# Patient Record
Sex: MALE | NOT HISPANIC OR LATINO | URBAN - METROPOLITAN AREA
[De-identification: names, ages, dates, MRNs, and addresses within clinical notes are randomized per-mention and may not be internally consistent; named-entity substitution may affect disease eponyms.]

---

## 2017-05-07 ENCOUNTER — EMERGENCY (EMERGENCY)
Facility: HOSPITAL | Age: 22
LOS: 1 days | Discharge: HOME | End: 2017-05-07
Attending: EMERGENCY MEDICINE | Admitting: EMERGENCY MEDICINE
Payer: COMMERCIAL

## 2017-05-07 VITALS
TEMPERATURE: 98 F | RESPIRATION RATE: 18 BRPM | DIASTOLIC BLOOD PRESSURE: 56 MMHG | OXYGEN SATURATION: 98 % | HEART RATE: 97 BPM | SYSTOLIC BLOOD PRESSURE: 108 MMHG

## 2017-05-07 VITALS
TEMPERATURE: 98 F | HEART RATE: 96 BPM | RESPIRATION RATE: 18 BRPM | HEIGHT: 71 IN | WEIGHT: 154.98 LBS | OXYGEN SATURATION: 97 % | DIASTOLIC BLOOD PRESSURE: 82 MMHG | SYSTOLIC BLOOD PRESSURE: 121 MMHG

## 2017-05-07 DIAGNOSIS — F43.23 ADJUSTMENT DISORDER WITH MIXED ANXIETY AND DEPRESSED MOOD: ICD-10-CM

## 2017-05-07 DIAGNOSIS — R45.851 SUICIDAL IDEATIONS: ICD-10-CM

## 2017-05-07 DIAGNOSIS — F10.10 ALCOHOL ABUSE, UNCOMPLICATED: ICD-10-CM

## 2017-05-07 LAB
ALBUMIN SERPL ELPH-MCNC: 4.5 G/DL — SIGNIFICANT CHANGE UP (ref 3.3–5)
ALP SERPL-CCNC: 56 U/L — SIGNIFICANT CHANGE UP (ref 40–120)
ALT FLD-CCNC: 14 U/L RC — SIGNIFICANT CHANGE UP (ref 10–45)
AMPHET UR-MCNC: NEGATIVE — SIGNIFICANT CHANGE UP
ANION GAP SERPL CALC-SCNC: 15 MMOL/L — SIGNIFICANT CHANGE UP (ref 5–17)
APAP SERPL-MCNC: <15 UG/ML — SIGNIFICANT CHANGE UP (ref 10–30)
AST SERPL-CCNC: 22 U/L — SIGNIFICANT CHANGE UP (ref 10–40)
BARBITURATES UR SCN-MCNC: NEGATIVE — SIGNIFICANT CHANGE UP
BENZODIAZ UR-MCNC: NEGATIVE — SIGNIFICANT CHANGE UP
BILIRUB SERPL-MCNC: 0.3 MG/DL — SIGNIFICANT CHANGE UP (ref 0.2–1.2)
BUN SERPL-MCNC: 15 MG/DL — SIGNIFICANT CHANGE UP (ref 7–23)
CALCIUM SERPL-MCNC: 8.5 MG/DL — SIGNIFICANT CHANGE UP (ref 8.4–10.5)
CHLORIDE SERPL-SCNC: 104 MMOL/L — SIGNIFICANT CHANGE UP (ref 96–108)
CO2 SERPL-SCNC: 23 MMOL/L — SIGNIFICANT CHANGE UP (ref 22–31)
COCAINE METAB.OTHER UR-MCNC: NEGATIVE — SIGNIFICANT CHANGE UP
CREAT SERPL-MCNC: 0.91 MG/DL — SIGNIFICANT CHANGE UP (ref 0.5–1.3)
ETHANOL SERPL-MCNC: 208 MG/DL — HIGH (ref 0–10)
GLUCOSE SERPL-MCNC: 91 MG/DL — SIGNIFICANT CHANGE UP (ref 70–99)
HCT VFR BLD CALC: 39.8 % — SIGNIFICANT CHANGE UP (ref 39–50)
HGB BLD-MCNC: 14.4 G/DL — SIGNIFICANT CHANGE UP (ref 13–17)
MCHC RBC-ENTMCNC: 30.5 PG — SIGNIFICANT CHANGE UP (ref 27–34)
MCHC RBC-ENTMCNC: 36.2 GM/DL — HIGH (ref 32–36)
MCV RBC AUTO: 84.2 FL — SIGNIFICANT CHANGE UP (ref 80–100)
METHADONE UR-MCNC: NEGATIVE — SIGNIFICANT CHANGE UP
OPIATES UR-MCNC: NEGATIVE — SIGNIFICANT CHANGE UP
OXYCODONE UR-MCNC: NEGATIVE — SIGNIFICANT CHANGE UP
PCP SPEC-MCNC: SIGNIFICANT CHANGE UP
PCP UR-MCNC: NEGATIVE — SIGNIFICANT CHANGE UP
PLATELET # BLD AUTO: 214 K/UL — SIGNIFICANT CHANGE UP (ref 150–400)
POTASSIUM SERPL-MCNC: 3.9 MMOL/L — SIGNIFICANT CHANGE UP (ref 3.5–5.3)
POTASSIUM SERPL-SCNC: 3.9 MMOL/L — SIGNIFICANT CHANGE UP (ref 3.5–5.3)
PROT SERPL-MCNC: 6.5 G/DL — SIGNIFICANT CHANGE UP (ref 6–8.3)
RBC # BLD: 4.73 M/UL — SIGNIFICANT CHANGE UP (ref 4.2–5.8)
RBC # FLD: 11.3 % — SIGNIFICANT CHANGE UP (ref 10.3–14.5)
SALICYLATES SERPL-MCNC: <2 MG/DL — LOW (ref 15–30)
SODIUM SERPL-SCNC: 142 MMOL/L — SIGNIFICANT CHANGE UP (ref 135–145)
THC UR QL: NEGATIVE — SIGNIFICANT CHANGE UP
WBC # BLD: 7.6 K/UL — SIGNIFICANT CHANGE UP (ref 3.8–10.5)
WBC # FLD AUTO: 7.6 K/UL — SIGNIFICANT CHANGE UP (ref 3.8–10.5)

## 2017-05-07 PROCEDURE — 99053 MED SERV 10PM-8AM 24 HR FAC: CPT

## 2017-05-07 PROCEDURE — 90792 PSYCH DIAG EVAL W/MED SRVCS: CPT | Mod: GC

## 2017-05-07 PROCEDURE — 99284 EMERGENCY DEPT VISIT MOD MDM: CPT | Mod: 25

## 2017-05-07 PROCEDURE — 93010 ELECTROCARDIOGRAM REPORT: CPT

## 2017-05-07 NOTE — ED PROVIDER NOTE - OTHER FINDINGS
axis 86 NO MOHIT **ATTENDING ADDENDUM (Dr. James Arnold): left ventricular hypertrophy with likely early repolarization-associated ST changes

## 2017-05-07 NOTE — ED PROVIDER NOTE - EYES, MLM
Clear bilaterally, pupils equal, round and reactive to light. **ATTENDING ADDENDUM (Dr. James Arnold): Extraocular muscle movements intact. NO nystagmus. NO visual hallucinations reported.

## 2017-05-07 NOTE — ED PROVIDER NOTE - ENMT, MLM
Airway patent, Nasal mucosa clear. Mouth with normal mucosa. Throat has no vesicles, no oropharyngeal exudates and uvula is midline. **ATTENDING ADDENDUM (Dr. James Arnold): NO auditory hallucinations reported.

## 2017-05-07 NOTE — ED PROVIDER NOTE - GASTROINTESTINAL, MLM
Abdomen soft, non-tender **ATTENDING ADDENDUM (Dr. James Arnold): NO guarding, rebound, or rigidity. NO pulsatile or non-pulsatile masses. NO hernias. NO obvious hepatosplenomegaly.
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
## 2017-05-07 NOTE — ED BEHAVIORAL HEALTH ASSESSMENT NOTE - DESCRIPTION (FIRST USE, LAST USE, QUANTITY, FREQUENCY, DURATION)
Last had 10 drinks yesterday evening. Drinks that amount on weekends or special occasions socially with college friends.
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
## 2017-05-07 NOTE — ED BEHAVIORAL HEALTH ASSESSMENT NOTE - DESCRIPTION
Patient was calm and cooperative in the ED, requiring no PRNs or emergent intramuscular medications. CBC, CMP within normal limits. Utox negative. Blood alcohol level 208 at 5 am. 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 Family in [place], where he grew up. See HPI for more information.

## 2017-05-07 NOTE — ED PROVIDER NOTE - RESPIRATORY, MLM
Breath sounds clear and equal bilaterally. **ATTENDING ADDENDUM (Dr. James Arnold): NO wheezing, rales, rhonchi, crackles, stridor, drooling, retractions, nasal flaring, or tripoding.

## 2017-05-07 NOTE — ED PROVIDER NOTE - MEDICAL DECISION MAKING DETAILS
**ATTENDING MEDICAL DECISION MAKING/SYNTHESIS (Dr. James Arnold): I have reviewed the Chief Complaint, the HPI, the ROS, and have directly performed and confirmed the findings on the Physical Examination. I have reviewed the medical decision making with all providers, as applicable. The PROBLEM REPRESENTATION at this time is: 21-year-old man with NO reported prior history of psychiatric admissions/chronic care (but did admit previous history of suicidal ideation) now presenting with suicidal ideation (NO homicidal ideation, thought broadcasting, flight of ideas, thought insertion, auditory hallucinations, or visual hallucinations; NO recent stressors except school [[employer]]), POSITIVE self-injurious behavior (hit forehead with own fist). The MOST LIKELY DIAGNOSIS, and the LIST OF DIFFERENTIAL DIAGNOSES, includes (but is not limited to) the following: suicidal ideation, adjustment disorder, intoxication/delirium, homicidal ideation (NO evidence of this diagnosis), major depressive disorder. The likelihood of each of these diagnoses has been appropriately considered in the context of this patient's presentation and my evaluation. PLAN: as described in EMR, including diagnostics, therapeutics and consultation as clinically warranted. I will continue to reevaluate the patient, including the results of all testing, and monitor response to therapy throughout the patient's course in the ED.

## 2017-05-07 NOTE — ED PROVIDER NOTE - CHPI ED SYMPTOMS NEG
**ATTENDING ADDENDUM (Dr. James Arnold): admits some alcohol use/no agitation/no confusion/no disorientation/no hallucinations/no homicidal/no weakness/no weight loss/no change in level of consciousness/no paranoia

## 2017-05-07 NOTE — ED PROVIDER NOTE - SHIFT CHANGE DETAILS
**ATTENDING ADDENDUM (Dr. James Arnold): (1) reassess patient (2) followup on psychiatric consultation and recommendations

## 2017-05-07 NOTE — ED BEHAVIORAL HEALTH ASSESSMENT NOTE - SUMMARY
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 On interview, patient does not meet criteria for a depressive, manic, or psychotic episode. His symptoms can be linked to psychosocial stressors and alcohol use. He meets criteria for alcohol use disorder and adjustment disorder. Patient does not require inpatient admission as he is not at imminent risk to self or others. He is appropriate for discharge with close outpatient follow up.

## 2017-05-07 NOTE — ED PROVIDER NOTE - PSYCHIATRIC RISK
**ATTENDING ADDENDUM (Dr. James Arnold): NO specific plan/attempt, NO homicidal ideation/VERBALIZING SUICIDAL IDEATIONS

## 2017-05-07 NOTE — ED PROVIDER NOTE - PSYCHIATRIC PERCEPTION
**ATTENDING ADDENDUM (Dr. James Arnold): NO flight of ideas, thought broadcasting, or thought insertion./normal

## 2017-05-07 NOTE — ED PROVIDER NOTE - OBJECTIVE STATEMENT
**ATTENDING OBJECTIVE STATEMENT (Dr. James Arnold): I have reviewed this note, the presenting symptoms, and the Chief Complaint and the History of Present Illness as documented, with the other care provider(s) and nurses on the patient care team. I have also reviewed this patient's past medical/surgical history and social/family history as reviewed and listed in this electronic medical record. Patient is a 21-year-old man with NO reported prior history of psychiatric admissions/chronic care (but did admit previous history of suicidal ideation) now presenting with suicidal ideation (NO homicidal ideation, thought broadcasting, flight of ideas, thought insertion, auditory hallucinations, or visual hallucinations; NO recent stressors except school [[employer]]), POSITIVE self-injurious behavior (hit forehead with own fist).
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
## 2017-05-07 NOTE — ED PROVIDER NOTE - CRANIAL NERVE AND PUPILLARY EXAM
cranial nerves 2-12 intact/central and peripheral vision intact/extra-ocular movements intact/tongue is midline

## 2017-05-07 NOTE — ED BEHAVIORAL HEALTH ASSESSMENT NOTE - SUICIDE PROTECTIVE FACTORS
Responsibility to family and others/Identifies reasons for living/Future oriented/Supportive social network or family/Fear of death or dying due to pain/suffering/Engaged in work or school

## 2017-05-07 NOTE — ED PROVIDER NOTE - PROGRESS NOTE DETAILS
**ATTENDING ADDENDUM (Dr. James Arnold): patient evaluated by me. Was conversant. Psychiatry called. Requested additional diagnostics, to evaluate patient when diagnostics return to confirm patient's level of intoxication (admitted alcohol ingestion prior to arrival last PM/this AM). Will continue to observe and monitor closely. Attending Note (Ronald): Signed out pending psych eval. Psych note appreciated. calm and cooperative in ED.  DC.

## 2017-05-07 NOTE — ED PROVIDER NOTE - PLAN OF CARE
ATTENDING ADDENDUM (Dr. James Arnold): Goals of care include resolution of emergent/urgent symptoms and concerns, and restoration to baseline level of homeostasis. ATTENDING ADDENDUM (Dr. James Arnold): (1) anticipatory guidance provided  (2) rest  (3) outpatient follow-up with your primary care physician/provider (4) return if symptoms worsen, persist, or do not resolve (5) medications, if indicated, as prescribed
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
## 2017-05-07 NOTE — ED PROVIDER NOTE - CONDUCTED A DETAILED DISCUSSION WITH PATIENT AND/OR GUARDIAN REGARDING, MDM
**ATTENDING ADDENDUM (Dr. James Arnold): Anticipatory guidance provided./need for outpatient follow-up/return to ED if symptoms worsen, persist or questions arise

## 2017-05-07 NOTE — ED PROVIDER NOTE - PHYSICAL EXAMINATION
**ATTENDING ADDENDUM (Dr. James Arnold): I have reviewed and substantially contributed to the elements of the PE as documented above. I have directly performed an examination of this patient in conjunction with the other members (EM resident/PA/NP) of the patient care team.

## 2017-05-07 NOTE — ED PROVIDER NOTE - CARE PLAN
Goal:	ATTENDING ADDENDUM (Dr. James Arnold): Goals of care include resolution of emergent/urgent symptoms and concerns, and restoration to baseline level of homeostasis. Principal Discharge DX:	Adjustment disorder with mixed anxiety and depressed mood  Goal:	ATTENDING ADDENDUM (Dr. James Arnold): Goals of care include resolution of emergent/urgent symptoms and concerns, and restoration to baseline level of homeostasis.  Instructions for follow-up, activity and diet:	ATTENDING ADDENDUM (Dr. James Arnold): (1) anticipatory guidance provided  (2) rest  (3) outpatient follow-up with your primary care physician/provider (4) return if symptoms worsen, persist, or do not resolve (5) medications, if indicated, as prescribed  Secondary Diagnosis:	Alcohol use disorder, mild, abuse

## 2017-05-19 PROCEDURE — 83690 ASSAY OF LIPASE: CPT

## 2017-05-19 PROCEDURE — 80053 COMPREHEN METABOLIC PANEL: CPT

## 2017-05-19 PROCEDURE — 80307 DRUG TEST PRSMV CHEM ANLYZR: CPT

## 2017-05-19 PROCEDURE — 99285 EMERGENCY DEPT VISIT HI MDM: CPT | Mod: 25

## 2017-05-19 PROCEDURE — 93005 ELECTROCARDIOGRAM TRACING: CPT

## 2017-05-19 PROCEDURE — 85027 COMPLETE CBC AUTOMATED: CPT

## 2018-06-04 ENCOUNTER — EMERGENCY (EMERGENCY)
Facility: HOSPITAL | Age: 23
LOS: 1 days | Discharge: HOME | End: 2018-06-04
Admitting: EMERGENCY MEDICINE
Payer: COMMERCIAL

## 2018-06-04 PROCEDURE — 73610 X-RAY EXAM OF ANKLE: CPT

## 2018-06-04 PROCEDURE — 99284 EMERGENCY DEPT VISIT MOD MDM: CPT

## 2018-06-04 PROCEDURE — 73630 X-RAY EXAM OF FOOT: CPT

## 2018-06-04 PROCEDURE — 73630 X-RAY EXAM OF FOOT: CPT | Mod: 26,RT

## 2018-06-04 PROCEDURE — 73610 X-RAY EXAM OF ANKLE: CPT | Mod: 26,RT

## 2018-06-04 PROCEDURE — 99283 EMERGENCY DEPT VISIT LOW MDM: CPT

## 2020-06-29 NOTE — ED PROVIDER NOTE - PROGRESS NOTE
Stable.
acetaminophen 325 mg oral tablet: 2 tab(s) orally every 6 hours, As Needed  ibuprofen 600 mg oral tablet: 1 tab(s) orally every 6 hours, As Needed  methocarbamol 500 mg oral tablet: 1 tab(s) orally 3 times a day, As Needed -for - for muscle spasm

## 2023-02-01 RX ORDER — ONDANSETRON 4 MG/1
4 TABLET, ORALLY DISINTEGRATING ORAL EVERY 8 HOURS PRN
COMMUNITY
Start: 2022-03-18

## 2023-02-01 RX ORDER — BUTALBITAL, ACETAMINOPHEN AND CAFFEINE 300; 40; 50 MG/1; MG/1; MG/1
1 CAPSULE ORAL EVERY 4 HOURS PRN
COMMUNITY
Start: 2022-03-18

## 2023-03-21 NOTE — ED PROVIDER NOTE - PRIOR EKG STATUS
Outpatient Treatment Team Progress Note  Date of Review: 3/21/23        Multidisciplinary Outpatient Treatment Team met to discuss and review patient's progress in group and individual therapy sessions. Presenting Problem: Mood disorder     Patient's Current Treatment Status: Patient enrolled in the program for mood stabilization. Patient is engaging in psychotherapy and psycho education for symptom management and healthy coping skills including improved sleep hygiene and daily exercise. Patient will benefit from continued exploration of healthy support structures and functional independence.      Treatment Start Date: 2/2/23      Tentative Discharge Date: 3/14/23
the EKG is unchanged from prior EKG